# Patient Record
Sex: FEMALE | Race: WHITE | URBAN - METROPOLITAN AREA
[De-identification: names, ages, dates, MRNs, and addresses within clinical notes are randomized per-mention and may not be internally consistent; named-entity substitution may affect disease eponyms.]

---

## 2024-09-09 ENCOUNTER — LAB ENCOUNTER (OUTPATIENT)
Dept: LAB | Age: 44
End: 2024-09-09
Attending: FAMILY MEDICINE
Payer: COMMERCIAL

## 2024-09-09 ENCOUNTER — OFFICE VISIT (OUTPATIENT)
Dept: FAMILY MEDICINE CLINIC | Facility: CLINIC | Age: 44
End: 2024-09-09

## 2024-09-09 VITALS
BODY MASS INDEX: 23.21 KG/M2 | DIASTOLIC BLOOD PRESSURE: 78 MMHG | SYSTOLIC BLOOD PRESSURE: 110 MMHG | OXYGEN SATURATION: 98 % | WEIGHT: 131 LBS | HEART RATE: 73 BPM | HEIGHT: 63 IN | RESPIRATION RATE: 16 BRPM

## 2024-09-09 DIAGNOSIS — Z12.31 ENCOUNTER FOR SCREENING MAMMOGRAM FOR MALIGNANT NEOPLASM OF BREAST: ICD-10-CM

## 2024-09-09 DIAGNOSIS — Z00.00 ANNUAL PHYSICAL EXAM: Primary | ICD-10-CM

## 2024-09-09 DIAGNOSIS — Z00.00 ANNUAL PHYSICAL EXAM: ICD-10-CM

## 2024-09-09 LAB
ALBUMIN SERPL-MCNC: 4.3 G/DL (ref 3.2–4.8)
ALBUMIN/GLOB SERPL: 1.9 {RATIO} (ref 1–2)
ALP LIVER SERPL-CCNC: 49 U/L
ALT SERPL-CCNC: 21 U/L
ANION GAP SERPL CALC-SCNC: 7 MMOL/L (ref 0–18)
AST SERPL-CCNC: 21 U/L (ref ?–34)
BILIRUB SERPL-MCNC: 0.5 MG/DL (ref 0.3–1.2)
BUN BLD-MCNC: 12 MG/DL (ref 9–23)
BUN/CREAT SERPL: 16 (ref 10–20)
CALCIUM BLD-MCNC: 9.7 MG/DL (ref 8.7–10.4)
CHLORIDE SERPL-SCNC: 107 MMOL/L (ref 98–112)
CHOLEST SERPL-MCNC: 154 MG/DL (ref ?–200)
CO2 SERPL-SCNC: 27 MMOL/L (ref 21–32)
CREAT BLD-MCNC: 0.75 MG/DL
DEPRECATED RDW RBC AUTO: 42 FL (ref 35.1–46.3)
EGFRCR SERPLBLD CKD-EPI 2021: 101 ML/MIN/1.73M2 (ref 60–?)
ERYTHROCYTE [DISTWIDTH] IN BLOOD BY AUTOMATED COUNT: 11.9 % (ref 11–15)
EST. AVERAGE GLUCOSE BLD GHB EST-MCNC: 88 MG/DL (ref 68–126)
FASTING PATIENT LIPID ANSWER: NO
FASTING STATUS PATIENT QL REPORTED: NO
GLOBULIN PLAS-MCNC: 2.3 G/DL (ref 2–3.5)
GLUCOSE BLD-MCNC: 85 MG/DL (ref 70–99)
HBA1C MFR BLD: 4.7 % (ref ?–5.7)
HCT VFR BLD AUTO: 36 %
HDLC SERPL-MCNC: 57 MG/DL (ref 40–59)
HGB BLD-MCNC: 12.3 G/DL
LDLC SERPL CALC-MCNC: 85 MG/DL (ref ?–100)
MCH RBC QN AUTO: 32.8 PG (ref 26–34)
MCHC RBC AUTO-ENTMCNC: 34.2 G/DL (ref 31–37)
MCV RBC AUTO: 96 FL
NONHDLC SERPL-MCNC: 97 MG/DL (ref ?–130)
OSMOLALITY SERPL CALC.SUM OF ELEC: 291 MOSM/KG (ref 275–295)
PLATELET # BLD AUTO: 149 10(3)UL (ref 150–450)
POTASSIUM SERPL-SCNC: 3.8 MMOL/L (ref 3.5–5.1)
PROT SERPL-MCNC: 6.6 G/DL (ref 5.7–8.2)
RBC # BLD AUTO: 3.75 X10(6)UL
SODIUM SERPL-SCNC: 141 MMOL/L (ref 136–145)
T3FREE SERPL-MCNC: 3.06 PG/ML (ref 2.4–4.2)
T4 FREE SERPL-MCNC: 1.3 NG/DL (ref 0.8–1.7)
TRIGL SERPL-MCNC: 56 MG/DL (ref 30–149)
TSI SER-ACNC: 0.39 MIU/ML (ref 0.55–4.78)
VLDLC SERPL CALC-MCNC: 9 MG/DL (ref 0–30)
WBC # BLD AUTO: 3.9 X10(3) UL (ref 4–11)

## 2024-09-09 PROCEDURE — 85027 COMPLETE CBC AUTOMATED: CPT

## 2024-09-09 PROCEDURE — 80061 LIPID PANEL: CPT

## 2024-09-09 PROCEDURE — 84439 ASSAY OF FREE THYROXINE: CPT

## 2024-09-09 PROCEDURE — 80053 COMPREHEN METABOLIC PANEL: CPT

## 2024-09-09 PROCEDURE — 84443 ASSAY THYROID STIM HORMONE: CPT

## 2024-09-09 PROCEDURE — 36415 COLL VENOUS BLD VENIPUNCTURE: CPT

## 2024-09-09 PROCEDURE — 83036 HEMOGLOBIN GLYCOSYLATED A1C: CPT

## 2024-09-09 PROCEDURE — 99386 PREV VISIT NEW AGE 40-64: CPT | Performed by: FAMILY MEDICINE

## 2024-09-09 PROCEDURE — 84481 FREE ASSAY (FT-3): CPT

## 2024-09-09 RX ORDER — VALACYCLOVIR HYDROCHLORIDE 1 G/1
TABLET, FILM COATED ORAL
COMMUNITY
Start: 2024-08-15

## 2024-09-09 RX ORDER — TACROLIMUS 1 MG/G
OINTMENT TOPICAL
COMMUNITY
Start: 2024-05-02

## 2024-09-09 NOTE — H&P
HPI:    Nga Jesus is a 44 year old female presents to clinic for annual physical exam.  New patient.  Works as an executive in construction.   with 3 children.  Mother recently diagnosed with early onset dementia-65.   Normal appetite, balanced diet.  Normal bowel movements and urination.  She exercises regularly.  Sleeps well, 7 to 8 hours at night.  Patient's last menstrual period was 2024 (exact date).  Regular cycles, no concerns.  She gets recurrent vaginal yeast infections, usually takes monistat.   Also, reports mild thickening, discoloration of toenails.       HISTORY:  History reviewed. No pertinent past medical history.   Past Surgical History:   Procedure Laterality Date            Family History   Problem Relation Age of Onset    Diabetes Father     Dementia Mother       Social History:   Social History     Socioeconomic History    Marital status:    Tobacco Use    Smoking status: Never    Smokeless tobacco: Never   Vaping Use    Vaping status: Never Used   Substance and Sexual Activity    Alcohol use: Never    Drug use: Never        Medications (Active prior to today's visit):  Current Outpatient Medications   Medication Sig Dispense Refill    tacrolimus 0.1 % External Ointment       valACYclovir 1 G Oral Tab          Allergies:  Allergies   Allergen Reactions    Sulfa Antibiotics HIVES         Depression Screening (PHQ-2/PHQ-9): Over the LAST 2 WEEKS   Little interest or pleasure in doing things: More than half the days    Feeling down, depressed, or hopeless: Several days    PHQ-2 SCORE: 3   1. Little interest or pleasure in doing things: More than half the days  2. Feeling down, depressed, or hopeless: Several days  3. Trouble falling or staying asleep, or sleeping too much: Not at all  4. Feeling tired or having little energy: More than half the days  5. Poor appetite or overeating: Not at all  6. Feeling bad about yourself - or that you are a failure or have  let yourself or your family down: Not at all  7. Trouble concentrating on things, such as reading the newspaper or watching television: Several days  8. Moving or speaking so slowly that other people could have noticed. Or the opposite - being so fidgety or restless that you have been moving around a lot more than usual: Not at all  9. Thoughts that you would be better off dead, or of hurting yourself in some way: Not at all  PHQ-9 TOTAL SCORE: 6            ROS:   Review of Systems   All other systems reviewed and are negative.      PHYSICAL EXAM:     Vitals:    09/09/24 1253   BP: 110/78   BP Location: Right arm   Patient Position: Sitting   Cuff Size: adult   Pulse: 73   Resp: 16   SpO2: 98%   Weight: 131 lb (59.4 kg)   Height: 5' 3\" (1.6 m)     Physical Exam  Vitals reviewed.   Constitutional:       General: She is not in acute distress.  HENT:      Head: Normocephalic and atraumatic.      Right Ear: Tympanic membrane, ear canal and external ear normal.      Left Ear: Tympanic membrane, ear canal and external ear normal.      Nose: Nose normal.      Mouth/Throat:      Pharynx: Uvula midline.   Eyes:      Conjunctiva/sclera: Conjunctivae normal.      Pupils: Pupils are equal, round, and reactive to light.   Neck:      Thyroid: No thyromegaly.   Cardiovascular:      Rate and Rhythm: Normal rate and regular rhythm.      Heart sounds: Normal heart sounds. No murmur heard.  Pulmonary:      Effort: Pulmonary effort is normal. No respiratory distress.      Breath sounds: Normal breath sounds. No wheezing or rales.   Chest:   Breasts:     Right: Normal. No swelling, bleeding, inverted nipple, mass, nipple discharge, skin change or tenderness.      Left: Normal. No swelling, bleeding, inverted nipple, mass, nipple discharge, skin change or tenderness.   Abdominal:      General: Bowel sounds are normal. There is no distension.      Palpations: Abdomen is soft.      Tenderness: There is no abdominal tenderness. There is no  guarding or rebound.   Musculoskeletal:      Cervical back: Normal range of motion and neck supple.   Lymphadenopathy:      Cervical: No cervical adenopathy.   Neurological:      Mental Status: She is alert.         ASSESSMENT/PLAN:   (Z00.00) Annual physical exam  (primary encounter diagnosis)  Plan: Comp Metabolic Panel (14) [E], TSH W Reflex To         Free T4 [E], Lipid Panel [E], CBC, Platelet, No        Differential [E], Hemoglobin A1C [E]  - Immunizations UTD   - Reinforced healthy diet, lifestyle, and exercise.  - Past Medical/Social/Family histories reviewed  - Regular dental visits recommended   - Regular eye exams recommended     Health Maintenance   Topic Date Due    Pap Smear  Never done     Health Maintenance   Topic Date Due    Mammogram  Never done      Will return for Pap. I also instructed patient to reach out when she experiencing symptoms of a yeast infection for confirmation.   At this time we will hold on on treating for onychomycosis, symptoms are mild.     Follow up in 1 year or sooner if needed      (Z12.31) Encounter for screening mammogram for malignant neoplasm of breast  Plan: [facility] MYA 2D+3D SCREENING BILAT         (CPT=77067/26438)           Responsible party/patient verbalized understanding of information discussed. No barriers to learning observed.            Orders This Visit:  Orders Placed This Encounter   Procedures    Comp Metabolic Panel (14) [E]    TSH W Reflex To Free T4 [E]    Lipid Panel [E]    CBC, Platelet, No Differential [E]    Hemoglobin A1C [E]    TETANUS, DIPHTHERIA TOXOIDS AND ACELLULAR PERTUSIS VACCINE (TDAP), >7 YEARS, IM USE       Meds This Visit:  Requested Prescriptions      No prescriptions requested or ordered in this encounter       Imaging & Referrals:  TETANUS, DIPHTHERIA TOXOIDS AND ACELLULAR PERTUSIS VACCINE (TDAP), >7 YEARS, IM USE  [facility] MYA 2D+3D SCREENING BILAT (CPT=77067/10325)     Chaperone offered at visit today.     The 21st Century cures Act makes  medical notes like these available to patients in the interest of transparency.  However, be advised that this is a medical document.  It is intended as peer to peer communication.  It is written in medical language and may contain abbreviations or verbiage that are unfamiliar.  It may appear blunt or direct.  Medical documents are intended to carry relevant information, facts as evident, and the clinical opinion of the practitioner.      This note was created by [employer] voice recognition. Errors in content may be related to improper recognition by the system; efforts to review and correct have been done but errors may still exist. Please contact me with any questions.       9/9/2024  Satish Mojica MD

## 2025-06-24 ENCOUNTER — HOSPITAL ENCOUNTER (OUTPATIENT)
Age: 45
Discharge: HOME | End: 2025-06-24
Payer: COMMERCIAL

## 2025-06-24 VITALS
TEMPERATURE: 98 F | DIASTOLIC BLOOD PRESSURE: 74 MMHG | SYSTOLIC BLOOD PRESSURE: 128 MMHG | HEART RATE: 61 BPM | RESPIRATION RATE: 12 BRPM | OXYGEN SATURATION: 100 %

## 2025-06-24 DIAGNOSIS — H61.23 HEARING LOSS DUE TO CERUMEN IMPACTION, BILATERAL: Primary | ICD-10-CM

## 2025-06-24 PROCEDURE — 69210 REMOVE IMPACTED EAR WAX UNI: CPT | Performed by: NURSE PRACTITIONER

## 2025-06-24 NOTE — ED PROVIDER NOTES
History     Chief Complaint   Patient presents with    Ear Problem Pain       Subjective:   HPI    Nga Jesus, 44 year old female with notable medical history of n/a who presents with Left hearing loss. Patient reports Hx cerumen impactions causing hearing loss with patient having similar symptoms currently. Denies pain, discharge, trauma. She attempted ear drops at home w/o resolution.       Problem List[1]   Objective:   No pertinent past medical history.            No pertinent past surgical history.              No pertinent social history.            Medications Ordered Prior to Encounter[2]      Constitutional and vital signs reviewed.      All other systems reviewed and negative except as noted above.    I have reviewed the family history, social history, allergies, and outpatient medications.     History reviewed from EMR: Encounters, problem list, allergies, medications      Physical Exam     ED Triage Vitals [06/24/25 1208]   /74   Pulse 61   Resp 12   Temp 97.9 °F (36.6 °C)   Temp src Oral   SpO2 100 %   O2 Device None (Room air)       Current:/74   Pulse 61   Temp 97.9 °F (36.6 °C) (Oral)   Resp 12   LMP 09/06/2024 (Exact Date)   SpO2 100%       Physical Exam  Vitals and nursing note reviewed.   Constitutional:       General: She is not in acute distress.     Appearance: Normal appearance. She is normal weight. She is not ill-appearing or toxic-appearing.   HENT:      Head: Normocephalic and atraumatic.      Right Ear: External ear normal. There is impacted cerumen.      Left Ear: External ear normal. There is impacted cerumen.      Nose: Nose normal.      Mouth/Throat:      Mouth: Mucous membranes are moist.   Eyes:      Extraocular Movements: Extraocular movements intact.      Conjunctiva/sclera: Conjunctivae normal.      Pupils: Pupils are equal, round, and reactive to light.   Cardiovascular:      Rate and Rhythm: Normal rate.      Pulses: Normal pulses.   Pulmonary:       Effort: Pulmonary effort is normal. No respiratory distress.   Musculoskeletal:         General: No swelling, tenderness or signs of injury. Normal range of motion.      Cervical back: Normal range of motion and neck supple.   Skin:     General: Skin is warm and dry.      Capillary Refill: Capillary refill takes less than 2 seconds.      Coloration: Skin is not jaundiced.   Neurological:      General: No focal deficit present.      Mental Status: She is alert and oriented to person, place, and time. Mental status is at baseline.   Psychiatric:         Mood and Affect: Mood normal.         Behavior: Behavior normal.         Thought Content: Thought content normal.         Judgment: Judgment normal.            ED Course     Labs Reviewed - No data to display  No orders to display       Vitals:    06/24/25 1208   BP: 128/74   Pulse: 61   Resp: 12   Temp: 97.9 °F (36.6 °C)   TempSrc: Oral   SpO2: 100%            [facility]        Nga Jesus, 44 year old female with medical history as noted above who presents with cerumen impactions   - Patient in NAD, VSS   - Bilateral cerumen impactions   - Main cerumen bulk removed via curetted, with remaining cerumen removed via irrigation.   - TMs and canals reinspected and not c/w infection   - Ear hygiene discussed       ** See ED course below for additional information on care provided / interventions / notable events throughout patient's encounter.           ** I have independently reviewed the radiology images, clinical lab results, and ECG tracings as described above (if applicable)    ** Concerning co-morbidities possibly affecting complaint / care: n/a        Medical Decision Making  Risk  OTC drugs.        Disposition and Plan     Disposition:  Discharge  6/24/2025 12:26 pm    Clinical Impression:  1. Hearing loss due to cerumen impaction, bilateral            Home care instructions:       - Avoid using ear pods as this can increase earwax production, change the  composition of the wax, and inhibit natural excretion of wax    Earwax Care Instructions   - You may benefit from using either Debrox or a capful of peroxide a few times a week to prevent / break up ear wax. Make sure to open up ear canal by gently pulling back on ear while placing Debrox or peroxide into ear canal. Let soak for 10-15 minutes prior to flushing with warm water (either using a flush or shower stream), and tilting head to let water exit ear (opening up canal again can help with drainage). Use tissue paper rolled to a point to help wick out residual water from ear canal   - You can do this a few times in a row as tolerated (stop if / when you experience pain)        Follow-up:  No follow-up provider specified.        Medications Prescribed:  Discharge Medication List as of 6/24/2025 12:27 PM            Ole Valenzuela, DNP, APRN, AGACNP-BC, FNP-C, CNL  Adult-Gerontology Acute Care & Family Nurse Practitioner  [facility]      The above patient (and/or guardian) was made aware that an appropriate evaluation has been performed, and that no additional testing is required at this time. In my medical judgment, there is currently no evidence of an immediate life-threatening or surgical condition, therefore discharge is indicated at this time. The patient (and/or guardian) was advised that a small risk still exists that a serious condition could develop. The patient was instructed to arrange close follow-up with their primary care provider (or the referral provider given today). The patient received written and verbal instructions regarding their condition / concerns, demonstrated understanding, and is agreement with the outpatient treatment plan.              [1] There is no problem list on file for this patient.   [2]   No current facility-administered medications on file prior to encounter.     Current Outpatient Medications on File Prior to Encounter   Medication Sig Dispense Refill     tacrolimus 0.1 % External Ointment       valACYclovir 1 G Oral Tab

## 2025-06-24 NOTE — DISCHARGE INSTRUCTIONS
- Avoid using ear pods as this can increase earwax production, change the composition of the wax, and inhibit natural excretion of wax    Earwax Care Instructions   - You may benefit from using either Debrox or a capful of peroxide a few times a week to prevent / break up ear wax. Make sure to open up ear canal by gently pulling back on ear while placing Debrox or peroxide into ear canal. Let soak for 10-15 minutes prior to flushing with warm water (either using a flush or shower stream), and tilting head to let water exit ear (opening up canal again can help with drainage). Use tissue paper rolled to a point to help wick out residual water from ear canal   - You can do this a few times in a row as tolerated (stop if / when you experience pain)